# Patient Record
Sex: FEMALE | Race: WHITE | Employment: OTHER | URBAN - NONMETROPOLITAN AREA
[De-identification: names, ages, dates, MRNs, and addresses within clinical notes are randomized per-mention and may not be internally consistent; named-entity substitution may affect disease eponyms.]

---

## 2022-09-22 ENCOUNTER — APPOINTMENT (OUTPATIENT)
Dept: GENERAL RADIOLOGY | Age: 87
End: 2022-09-22
Payer: COMMERCIAL

## 2022-09-22 ENCOUNTER — APPOINTMENT (OUTPATIENT)
Dept: CT IMAGING | Age: 87
End: 2022-09-22
Payer: COMMERCIAL

## 2022-09-22 ENCOUNTER — HOSPITAL ENCOUNTER (EMERGENCY)
Age: 87
Discharge: HOME | End: 2022-09-22
Attending: EMERGENCY MEDICINE
Payer: COMMERCIAL

## 2022-09-22 VITALS
TEMPERATURE: 99.5 F | RESPIRATION RATE: 18 BRPM | WEIGHT: 109 LBS | DIASTOLIC BLOOD PRESSURE: 43 MMHG | BODY MASS INDEX: 20.06 KG/M2 | HEART RATE: 2 BPM | OXYGEN SATURATION: 95 % | HEIGHT: 62 IN | SYSTOLIC BLOOD PRESSURE: 96 MMHG

## 2022-09-22 DIAGNOSIS — M25.562 ACUTE PAIN OF BOTH KNEES: Primary | ICD-10-CM

## 2022-09-22 DIAGNOSIS — M25.561 ACUTE PAIN OF BOTH KNEES: Primary | ICD-10-CM

## 2022-09-22 PROBLEM — I10 PRIMARY HYPERTENSION: Status: ACTIVE | Noted: 2022-09-22

## 2022-09-22 PROBLEM — F33.9 RECURRENT MAJOR DEPRESSIVE DISORDER (HCC): Status: ACTIVE | Noted: 2022-09-22

## 2022-09-22 PROBLEM — G30.1 LATE ONSET ALZHEIMER'S DEMENTIA WITH BEHAVIORAL DISTURBANCE (HCC): Status: ACTIVE | Noted: 2022-09-22

## 2022-09-22 PROBLEM — E55.9 VITAMIN D DEFICIENCY: Status: ACTIVE | Noted: 2022-09-22

## 2022-09-22 PROBLEM — E53.8 B12 DEFICIENCY: Status: ACTIVE | Noted: 2022-09-22

## 2022-09-22 PROBLEM — R53.1 GENERALIZED WEAKNESS: Status: ACTIVE | Noted: 2022-09-22

## 2022-09-22 PROBLEM — F02.818 LATE ONSET ALZHEIMER'S DEMENTIA WITH BEHAVIORAL DISTURBANCE (HCC): Status: ACTIVE | Noted: 2022-09-22

## 2022-09-22 PROBLEM — D64.9 ANEMIA: Status: ACTIVE | Noted: 2022-09-22

## 2022-09-22 PROBLEM — S62.025D CLOSED NONDISPLACED FRACTURE OF MIDDLE THIRD OF SCAPHOID OF LEFT WRIST WITH ROUTINE HEALING: Status: ACTIVE | Noted: 2022-09-22

## 2022-09-22 PROBLEM — E78.49 OTHER HYPERLIPIDEMIA: Status: ACTIVE | Noted: 2022-09-22

## 2022-09-22 PROBLEM — K59.01 SLOW TRANSIT CONSTIPATION: Status: ACTIVE | Noted: 2022-09-22

## 2022-09-22 PROBLEM — E03.8 OTHER SPECIFIED HYPOTHYROIDISM: Status: ACTIVE | Noted: 2022-09-22

## 2022-09-22 PROBLEM — N18.30 STAGE 3 CHRONIC KIDNEY DISEASE (HCC): Status: ACTIVE | Noted: 2022-09-22

## 2022-09-22 PROBLEM — G47.09 OTHER INSOMNIA: Status: ACTIVE | Noted: 2022-09-22

## 2022-09-22 LAB
AMORPHOUS: ABNORMAL
BACTERIA: ABNORMAL
BILIRUBIN URINE: ABNORMAL
BLOOD, URINE: NEGATIVE
CASTS 2: ABNORMAL /LPF
CASTS UA: ABNORMAL /LPF
CHARACTER, URINE: CLEAR
COLOR: YELLOW
CRYSTALS, UA: ABNORMAL
EPITHELIAL CELLS, UA: ABNORMAL /HPF
GLUCOSE, URINE: NEGATIVE MG/DL
ICTOTEST: NEGATIVE
KETONES, URINE: ABNORMAL
LEUKOCYTE ESTERASE, URINE: NEGATIVE
MISCELLANEOUS LAB TEST RESULT: ABNORMAL
MUCUS: ABNORMAL
NITRITE, URINE: NEGATIVE
PH UA: 5.5 (ref 5–9)
PROTEIN UA: 30 MG/DL
RBC UA: ABNORMAL /HPF
REFLEX TO URINE C & S: ABNORMAL
SARS-COV-2, NAAT: NOT  DETECTED
SPECIFIC GRAVITY UA: 1.02 (ref 1–1.03)
UROBILINOGEN, URINE: 0.2 EU/DL (ref 0–1)
WBC UA: ABNORMAL /HPF

## 2022-09-22 PROCEDURE — 81001 URINALYSIS AUTO W/SCOPE: CPT

## 2022-09-22 PROCEDURE — 73564 X-RAY EXAM KNEE 4 OR MORE: CPT

## 2022-09-22 PROCEDURE — 73502 X-RAY EXAM HIP UNI 2-3 VIEWS: CPT

## 2022-09-22 PROCEDURE — 87635 SARS-COV-2 COVID-19 AMP PRB: CPT

## 2022-09-22 PROCEDURE — 72192 CT PELVIS W/O DYE: CPT

## 2022-09-22 PROCEDURE — 99284 EMERGENCY DEPT VISIT MOD MDM: CPT

## 2022-09-22 RX ORDER — SOY PROTEIN
POWDER (GRAM) ORAL
COMMUNITY

## 2022-09-22 RX ORDER — DONEPEZIL HYDROCHLORIDE 10 MG/1
10 TABLET, FILM COATED ORAL NIGHTLY
COMMUNITY

## 2022-09-22 RX ORDER — LEVOTHYROXINE SODIUM 112 UG/1
112 TABLET ORAL DAILY
COMMUNITY

## 2022-09-22 RX ORDER — ACETAMINOPHEN 500 MG
500 TABLET ORAL NIGHTLY
COMMUNITY

## 2022-09-22 RX ORDER — ACETAMINOPHEN 160 MG
TABLET,DISINTEGRATING ORAL
COMMUNITY

## 2022-09-22 ASSESSMENT — ENCOUNTER SYMPTOMS
WHEEZING: 0
BLOOD IN STOOL: 0
SHORTNESS OF BREATH: 0
EYE PAIN: 0
ABDOMINAL PAIN: 0
EYE DISCHARGE: 0
DIARRHEA: 0
SORE THROAT: 0

## 2022-09-22 ASSESSMENT — PAIN SCALES - PAIN ASSESSMENT IN ADVANCED DEMENTIA (PAINAD)
NEGVOCALIZATION: 1
TOTALSCORE: 2
CONSOLABILITY: 0
BODYLANGUAGE: 0
BREATHING: 0
FACIALEXPRESSION: 1

## 2022-09-22 ASSESSMENT — PAIN - FUNCTIONAL ASSESSMENT: PAIN_FUNCTIONAL_ASSESSMENT: PAIN ASSESSMENT IN ADVANCED DEMENTIA (PAINAD)

## 2022-09-22 NOTE — ED TRIAGE NOTES
Pt to ED by EMS from [facility] with c/c left knee pain. Pt has hx dementia. Pt son reports that pt broke wrist approx 4 weeks ago. Pt stayed in transitional care unit in [place] for rehab for approx 3 weeks prior to going back home. 3 days ago, pt son reports new fall and increased weakness. Pt has not been able to ambulate since fall and winces in pain with any sort of movement. Pt left knee significantly swollen. Pt vitals stable.

## 2022-09-22 NOTE — ED NOTES
Pt straight cathed for urine specimen at this time. tolerated well.       Eugenio Trinidad RN  09/22/22 1914

## 2022-09-22 NOTE — ED PROVIDER NOTES
[address]  [address] [place] [address] [address]  Phone: [phone]    eMERGENCY dEPARTMENT eNCOUnter           [facility]       Chief Complaint   Patient presents with    Knee Pain       Nurses Notes reviewed and I agree except as noted in the HPI. HISTORY OF PRESENT ILLNESS    Luann Hinds is a 80 y.o. female who presented via EMS. Chief complaint: Unable to walk. Patient is demented, cannot provide any history. History is provided from nursing home and from her son. She fell at home 3 weeks ago and had nondisplaced fracture of the left wrist.  She was admitted to [place], [place]. Patient was transferred to rehab center afterwards, she was walking with difficulty in the rehab. She was transferred to the [address] 2 days ago. She has not been able to walk for them. Patient cannot answer any questions but she seems uncomfortable when walking per nursing home. There is no further falls reported. Patient's baseline is dementia which has been stable. She has no fever or vomiting. REVIEW OF SYSTEMS     Review of Systems   Unable to perform ROS: Dementia   Constitutional:  Negative for chills and fever. HENT:  Negative for sore throat. Eyes:  Negative for pain and discharge. Respiratory:  Negative for shortness of breath and wheezing. Cardiovascular:  Negative for chest pain and palpitations. Gastrointestinal:  Negative for abdominal pain, blood in stool and diarrhea. Genitourinary:  Negative for dysuria and hematuria. Musculoskeletal:  Negative for neck pain and neck stiffness. Neurological:  Negative for seizures, syncope and headaches. Hematological:  Negative for adenopathy. Psychiatric/Behavioral:  Negative for agitation and hallucinations. PAST MEDICAL HISTORY    has a past medical history of Dementia ([facility] [address] SURGICAL HISTORY      has no past surgical history on file.     CURRENT MEDICATIONS Previous Medications    ACETAMINOPHEN (TYLENOL) 500 MG TABLET    Take 500 mg by mouth nightly    CHOLECALCIFEROL (VITAMIN D3) 50 MCG (2000 UT) CAPS    Take by mouth    COBALAMIN COMBINATIONS (VITAMIN B12-FOLIC ACID) 885-692 MCG TABS    Take by mouth    DONEPEZIL (ARICEPT) 10 MG TABLET    Take 10 mg by mouth nightly    LEVOTHYROXINE (SYNTHROID) 112 MCG TABLET    Take 112 mcg by mouth Daily    MELATONIN 5 MG CAPS    Take by mouth    METOPROLOL TARTRATE (LOPRESSOR) 25 MG TABLET    Take 12.5 mg by mouth 2 times daily    SERTRALINE (ZOLOFT) 50 MG TABLET    Take 50 mg by mouth daily    TUBERCULIN 5 UNIT/0.1ML INJECTION    Inject 5 Units into the skin On the 1st of every 12 month       ALLERGIES     is allergic to nsaids and tramadol. FAMILY HISTORY     has no family status information on file. family history is not on file. SOCIAL HISTORY          PHYSICAL EXAM     INITIAL VITALS:  height is 5' 2\" (1.575 m) and weight is 109 lb (49.4 kg). Her oral temperature is 99.5 °F (37.5 °C). Her blood pressure is 96/43 (abnormal) and her pulse is 105 (abnormal). Her respiration is 16 and oxygen saturation is 95%. Physical Exam  Vitals and nursing note reviewed. Constitutional:       Appearance: She is well-developed. Comments: She is awake and alert. She does not appear ill or toxic   HENT:      Head: Atraumatic. Eyes:      Conjunctiva/sclera: Conjunctivae normal.      Pupils: Pupils are equal, round, and reactive to light. Neck:      Thyroid: No thyromegaly. Vascular: No JVD. Trachea: No tracheal deviation. Cardiovascular:      Rate and Rhythm: Normal rate and regular rhythm. Heart sounds: No murmur heard. No friction rub. No gallop. Pulmonary:      Effort: Pulmonary effort is normal.      Breath sounds: Normal breath sounds. Abdominal:      General: Bowel sounds are normal.      Palpations: Abdomen is soft. Tenderness: There is no abdominal tenderness.    Musculoskeletal: Cervical back: Neck supple. Comments: Her left leg is resting on a pillow and mild flexion. There is bilateral knee swelling without significant tenderness palpation. There is discomfort flexion or rotation of the left hip. She has normal pedal pulses. Neurological:      Comments: She is awake and alert, is very demented, cannot follow any commands. DIFFERENTIAL DIAGNOSIS:       DIAGNOSTIC RESULTS         RADIOLOGY:   Bilateral knee x-ray showed severe osteoarthritis. Bilateral hip x-ray were unremarkable. The CT scan showed no fracture. LABS:   Labs Reviewed   URINALYSIS WITH REFLEX TO CULTURE - Abnormal; Notable for the following components:       Result Value    Bilirubin Urine SMALL (*)     Ketones, Urine TRACE (*)     Protein, UA 30 (*)     All other components within normal limits   COVID-19, RAPID   ICTOTEST, URINE       EMERGENCY DEPARTMENT COURSE:   Vitals:    Vitals:    09/22/22 1833 09/22/22 1958 09/22/22 2058   BP: (!) 104/46 (!) 108/90 (!) 96/43   Pulse: (!) 105 98 (!) 105   Resp: 14 16 16   Temp: 99.5 °F (37.5 °C)     TempSrc: Oral     SpO2: 98% 98% 95%   Weight: 109 lb (49.4 kg)     Height: 5' 2\" (1.575 m)       Patient remained awake and alert, she did not seem to be in significant distress. I discussed diagnosis and treatment plan was her son. There is no evidence of hip or knee fracture. Her urine and COVID were negative. She will be discharged to nursing home. FINAL IMPRESSION      1. Acute pain of both knees          DISPOSITION/PLAN   Discharged to nursing  home in good condition.     PATIENT REFERRED TO:  Barney Florez MD  [address] [address]    In 1 day      DISCHARGE MEDICATIONS:  New Prescriptions    No medications on file       (Please note that portions of this note were completed with a voice recognition program.  Efforts were made to edit the dictations but occasionally words are mis-transcribed.)    Jacklyn Lopez, [facility], [place]  09/22/22 8753

## 2022-09-22 NOTE — ED NOTES
Pt resting on cot comfortably. Vitals stable. Family at bedside.       Jessica Kincaid RN  09/22/22 0049

## 2022-09-23 NOTE — DISCHARGE INSTRUCTIONS
Please ask your family doctor for physical therapy  Please take Tylenol 500 mg every 6 hours as needed for pain  Please return if worse or new symptoms

## 2022-09-23 NOTE — ED NOTES
Family would like the patient transferred back to the [facility] [place] per EMS. Dr. Casey Guadalupe made aware.       River Mcdonald RN  09/22/22 0348

## 2022-10-05 PROBLEM — F41.9 ANXIETY: Status: ACTIVE | Noted: 2022-10-05

## 2022-10-06 ENCOUNTER — OUTSIDE SERVICES (OUTPATIENT)
Dept: FAMILY MEDICINE CLINIC | Age: 87
End: 2022-10-06
Payer: COMMERCIAL

## 2022-10-06 VITALS
WEIGHT: 107 LBS | RESPIRATION RATE: 18 BRPM | OXYGEN SATURATION: 98 % | SYSTOLIC BLOOD PRESSURE: 96 MMHG | HEART RATE: 78 BPM | DIASTOLIC BLOOD PRESSURE: 58 MMHG | TEMPERATURE: 98.2 F | BODY MASS INDEX: 19.57 KG/M2

## 2022-10-06 DIAGNOSIS — D64.9 ANEMIA, UNSPECIFIED TYPE: ICD-10-CM

## 2022-10-06 DIAGNOSIS — G30.1 LATE ONSET ALZHEIMER'S DEMENTIA WITH BEHAVIORAL DISTURBANCE (HCC): Primary | ICD-10-CM

## 2022-10-06 DIAGNOSIS — R53.1 GENERALIZED WEAKNESS: ICD-10-CM

## 2022-10-06 DIAGNOSIS — G47.09 OTHER INSOMNIA: ICD-10-CM

## 2022-10-06 DIAGNOSIS — E55.9 VITAMIN D DEFICIENCY: ICD-10-CM

## 2022-10-06 DIAGNOSIS — S62.025D CLOSED NONDISPLACED FRACTURE OF MIDDLE THIRD OF SCAPHOID OF LEFT WRIST WITH ROUTINE HEALING: ICD-10-CM

## 2022-10-06 DIAGNOSIS — K59.01 SLOW TRANSIT CONSTIPATION: ICD-10-CM

## 2022-10-06 DIAGNOSIS — E78.49 OTHER HYPERLIPIDEMIA: ICD-10-CM

## 2022-10-06 DIAGNOSIS — E03.8 OTHER SPECIFIED HYPOTHYROIDISM: ICD-10-CM

## 2022-10-06 DIAGNOSIS — F33.9 RECURRENT MAJOR DEPRESSIVE DISORDER, REMISSION STATUS UNSPECIFIED (HCC): ICD-10-CM

## 2022-10-06 DIAGNOSIS — F41.9 ANXIETY: ICD-10-CM

## 2022-10-06 DIAGNOSIS — E53.8 B12 DEFICIENCY: ICD-10-CM

## 2022-10-06 DIAGNOSIS — I10 PRIMARY HYPERTENSION: ICD-10-CM

## 2022-10-06 DIAGNOSIS — F02.818 LATE ONSET ALZHEIMER'S DEMENTIA WITH BEHAVIORAL DISTURBANCE (HCC): Primary | ICD-10-CM

## 2022-10-06 DIAGNOSIS — N18.30 STAGE 3 CHRONIC KIDNEY DISEASE, UNSPECIFIED WHETHER STAGE 3A OR 3B CKD (HCC): ICD-10-CM

## 2022-10-06 PROCEDURE — 99490 CHRNC CARE MGMT STAFF 1ST 20: CPT | Performed by: FAMILY MEDICINE

## 2022-10-06 PROCEDURE — 99310 SBSQ NF CARE HIGH MDM 45: CPT | Performed by: FAMILY MEDICINE

## 2022-10-06 NOTE — PROGRESS NOTES
Eliana Romero is a 80 y.o. female who presents today for her medical conditions/complaints as noted below. Chief Complaint   Patient presents with    1 Month Follow-Up           HPI:     Medina Stacy is seen as a non skilled admission to the facility on 9/21/22. She is coming to us from [facility]. Per the son, she fell and broke her wrist about 4 weeks prior to coming herer. Was sent to rehab for about 3 weeks and was then sent home. She had more confusion and weakness and they brought her to the hospital.  He states that they did nothing for her and found nothing. He states that she has had dementia and that either he or his brother had been staying with her at her house. He stated that there were not nursing homes open around where she lived and he lives close to here, so she was brought here. He states that she always knew who they were at home, but cannot give examples of her normal state. He stated that they could not take care of her this way. She is seen while sitting in bed. She is unable to state her name and seems agitated with any memory questions stating \"I just don't know anymore\". She does not follow commands. She appears thin and frail. She is not wearing a splint to her left wrist.        According to prior notes there is question of possible admission to hospice due to her recent rapid cognitive decline but the family would like to start with palliative care. Xray is pending today from a recheck of a left wrist fracture. She is taking zoloft and prn xanax, we may need to increase the zoloft to 100mg to help the anxiety. Past Medical History:   Diagnosis Date    Dementia ([facility] [address]       No past surgical history on file. No family history on file.     Social History     Tobacco Use    Smoking status: Not on file    Smokeless tobacco: Not on file   Substance Use Topics    Alcohol use: Not on file      Allergies   Allergen Reactions    Nsaids     Tramadol        Health Maintenance   Topic Date Due    Depression Monitoring  Never done    DTaP/Tdap/Td vaccine (1 - Tdap) Never done    Shingles vaccine (1 of 2) Never done    Flu vaccine (1) Never done    Pneumococcal 65+ years Vaccine  Completed    COVID-19 Vaccine  Completed    Hepatitis A vaccine  Aged Out    Hepatitis B vaccine  Aged Out    Hib vaccine  Aged Out    Meningococcal (ACWY) vaccine  Aged Out       Subjective:      Review of Systems   Unable to perform ROS: Dementia     Objective:     Physical Exam  Vitals and nursing note reviewed. Constitutional:       General: She is not in acute distress. Appearance: She is not ill-appearing or diaphoretic. Comments: Thin , pale, and frail   HENT:      Head: Normocephalic and atraumatic. Right Ear: Ear canal and external ear normal.      Left Ear: Ear canal and external ear normal.      Nose: Nose normal. No congestion or rhinorrhea. Mouth/Throat:      Mouth: Mucous membranes are moist.      Pharynx: No oropharyngeal exudate. Eyes:      General: No scleral icterus. Right eye: No discharge. Left eye: No discharge. Extraocular Movements: Extraocular movements intact. Conjunctiva/sclera: Conjunctivae normal.   Cardiovascular:      Rate and Rhythm: Normal rate and regular rhythm. Pulses: Normal pulses. Heart sounds: Normal heart sounds. Pulmonary:      Effort: Pulmonary effort is normal. No respiratory distress. Breath sounds: Normal breath sounds. No wheezing, rhonchi or rales. Abdominal:      General: Bowel sounds are normal. There is no distension. Palpations: Abdomen is soft. Tenderness: There is no abdominal tenderness. There is no guarding. Musculoskeletal:         General: Tenderness (to left wrist) present. No swelling. Cervical back: Normal range of motion and neck supple. No rigidity or tenderness. Comments: Limited ROM due to weakness   Skin:     General: Skin is warm and dry. Coloration: Skin is not jaundiced or pale. Neurological:      Mental Status: She is alert. She is disoriented. BP (!) 96/58   Pulse 78   Temp 98.2 °F (36.8 °C)   Resp 18   Wt 107 lb (48.5 kg)   SpO2 98%   BMI 19.57 kg/m²     Wt Readings from Last 3 Encounters:   10/06/22 107 lb (48.5 kg)   10/05/22 107 lb (48.5 kg)   09/27/22 107 lb (48.5 kg)       Assessment/Plan      1. Late onset Alzheimer's dementia with behavioral disturbance ([facility]   Fall and safety precautions  Continue donepezil  Consider namenda   2. Other specified hypothyroidism   Will get baseline labs next week  Continue levothyroxine   3. Primary hypertension   Controlled - continue to monitor  Continue metoprolol   Will get baseline labs next week   4. Recurrent major depressive disorder, remission status unspecified ([facility]   Continue Zoloft increase to 100 mg and continue prn xanax   5. Other insomnia   Continue Melatonin   6. B12 deficiency   Continue Supplements  Labs next week   7. Vitamin D deficiency   Continue supplements  Labs next week   8. Closed nondisplaced fracture of left wrist with routine healing   discontinue splint  Monitor skin for abrasions and healing daily  f/u xrays shows it is healed   9. Anemia, unspecified type   Monitor for s/s active bleeding  Chronic   Labs next week   10. Stage 3 chronic kidney disease, unspecified whether stage 3a or 3b CKD ([facility]   Labs next week   11. Other hyperlipidemia   On no meds - will get baseline -    12. Slow transit constipation   Monitor - will add if needed   13. Generalized weakness   PT/OT to eval and treat     History reviewed in the chart. patient is unable to give hx, son not present during exam.    Total time spent on date of service was 45 minutes.   Time spent includes some or all of the following, both face-to-face time and non face-to-face, but is not limited to: reviewing records or discussing history or pan with colleagues, obtaining and/or reviewing the history, performing a medically appropriate examination/evaluation, counseling patient and/or caregiver, documentation, placing orders/referrals, and coordinating care. Resident has DX, dementia, HTN, Depression, hypothyroidism, and these are expected to last 15 or more months. These chronic conditions place the resident at a significant risk of death, acute exacerbation, or functional decline. The patient's comprehensive plan was monitored today. I spent 20 minutes reviewing the plan. Over 25 minutes spent with patient with >50% spent in counseling and coordination of care, chart review, results review, and coordinating with the nursing and PT staff.          Electronically signed by Deepika Argueta MD on 10/6/2022 at 2:25 PM